# Patient Record
Sex: FEMALE | Race: ASIAN | NOT HISPANIC OR LATINO | Employment: OTHER | URBAN - METROPOLITAN AREA
[De-identification: names, ages, dates, MRNs, and addresses within clinical notes are randomized per-mention and may not be internally consistent; named-entity substitution may affect disease eponyms.]

---

## 2017-11-03 NOTE — PATIENT DISCUSSION
MARY ELLEN, OU- MOST LIKELY DUE TO MGD. TREAT MGD FIRST THEN CONSIDER OTHER TREATMENT OPTIONS IF SYMPTOMS PERSIST.

## 2017-11-03 NOTE — PATIENT DISCUSSION
BLEPHARITIS/MGD, OU- PRESCRIBE LOTEMAX QID, OU. RECOMMEND WARM COMPRESSES AND EYELID SCRUBS BID, ARTIFICIAL TEARS BID-QID, AND THE DAILY INTAKE OF OMEGA-3 FATTY ACIDS (ENCOURAGED JUAN MANUEL'S FISH OIL). CONSIDER LIPIFLOW IF SYMPTOMS PERSIST.

## 2017-11-03 NOTE — PATIENT DISCUSSION
New Prescription: Lotemax (loteprednol etabonate): gel: 0.5% 1 drop four times a day into both eyes 11-

## 2017-11-03 NOTE — PATIENT DISCUSSION
CATARACTS, OU- NOT VISUALLY SIGNIFICANT. DISC OPTION OF [id number]. GLASSES RX GIVEN TO FILL IF DESIRES.

## 2018-06-15 NOTE — PATIENT DISCUSSION
Ocular Rosacea Counseling:  Ocular Rosacea is inflammation that causes redness, itching, and burning of the eyes. I have explained to the patient that if left untreated, ocular rosacea can cause corneal damage from tear film insufficiency or eyelid damage from inflammation, both of which could lead to vision loss. Encouraged patient to avoid exacerbating environmental factors. Successful management is dependent on patient compliance with the treatment regimen.

## 2018-06-15 NOTE — PATIENT DISCUSSION
OCULAR ROSACEA, OU-  PRESCRIBE WARM COMPRESSES AND EYELID SCRUBS BID, ARTIFICIAL TEARS BID-QID AND THE DAILY INTAKE OF OMEGA-3 FATTY ACIDS. CONSIDER TOPICAL STEROID, ORAL TETRACYCLINE AND/OR LIPIFLOW FOR EXACERBATIONS. RETURN FOR FOLLOW-UP AS SCHEDULED.

## 2018-06-15 NOTE — PATIENT DISCUSSION
BLURRED VA, OU- MOST LIKELY DUE TO MGD/OCULAR ROSACEA.  DISC POSS LASER WITH DR CANTU IF NO IMPROVEMENT AFTER TX.

## 2018-06-15 NOTE — PATIENT DISCUSSION
Continue: Refresh Tears (carboxymethylcellulose sodium): drops: 0.5% 1 drop four times a day into both eyes

## 2018-10-22 NOTE — PATIENT DISCUSSION
DRY EYE/OCULAR ROSACEA, OU-  CONTINUE WARM COMPRESSES AND EYELID SCRUBS BID, ARTIFICIAL TEARS BID-QID AND THE DAILY INTAKE OF OMEGA-3 FATTY ACIDS. CONSIDER TOPICAL STEROID, ORAL TETRACYCLINE AND/OR LIPIFLOW FOR EXACERBATIONS.   RETURN FOR FOLLOW-UP IN 6 MONTHS WITH DILATION

## 2018-10-22 NOTE — PATIENT DISCUSSION
CATARACTS, OU - NOT VISUALLY SIGNIFICANT. DISC OPTION OF [id number]. GLASSES RX GIVEN TO FILL IF DESIRES.

## 2018-10-22 NOTE — PATIENT DISCUSSION
Salzmann's Nodular Degeneration Counseling: The diagnosis and its pathophysiology has been explained to the patient. Treatment options include artificial tears, mild topical steroids, or corneal scraping. The evaluation by a corneal specialist was recommended to the patient.

## 2019-07-23 NOTE — PATIENT DISCUSSION
New Prescription: Alrex (loteprednol etabonate): drops,suspension: 0.2% 1 drop twice a day into both eyes 07-

## 2019-07-23 NOTE — PATIENT DISCUSSION
OCULAR ROSACEA, OU- CONSIDER TOPICAL STEROID, ORAL TETRACYCLINE AND/OR LIPIFLOW FOR EXACERBATIONS. RETURN FOR FOLLOW-UP AS SCHEDULED.

## 2019-07-23 NOTE — PATIENT DISCUSSION
BLEPHARITIS/MGD/OCULAR ROSACEA, OU- PERSISTENT. CONTINUE PRESENT TREATMENT OF ARTIFICIAL TEARS AND FISH OIL.

## 2019-07-23 NOTE — PATIENT DISCUSSION
CATARACTS, OU - NOT VISUALLY SIGNIFICANT. DISC OPTION OF [id number]. GLASSES RX GIVEN TO FILL IF DESIRES.

## 2019-07-23 NOTE — PATIENT DISCUSSION
MARY ELLEN, OU- MOST LIKELY DUE TO MGD AND ROSACEA. DISCUSSED TREATMENT OPTIONS OF LIPIFLOW OR LASER TREATMENT TO THE VESSELS IF LIPIFLOW DOES NOT IMPROVE.

## 2019-07-26 NOTE — PATIENT DISCUSSION
Continue: Alrex (loteprednol etabonate): drops,suspension: 0.2% 1 drop twice a day into both eyes 07-

## 2019-10-28 NOTE — PATIENT DISCUSSION
CATARACTS, OU- NOT VISUALLY SIGNIFICANT. DISC OPTION OF [id number]. GLASSES RX GIVEN TO FILL IF DESIRES.

## 2019-10-28 NOTE — PATIENT DISCUSSION
MGD OU - NO OIL PRODUCTION ON COMPRESSION IN OFFICE TODAY. PT DENIES LASER TREATMENT WITH DR. Evie Davis. HE WOULD LIKE TO TRY iLUX TREATMENT TODAY.

## 2019-10-28 NOTE — PATIENT DISCUSSION
christin performed in office today without complication. follow up with Dr. Lakisha Walsh in 1 month.

## 2019-11-27 NOTE — PATIENT DISCUSSION
MARY ELLEN [address] NOTICED NO IMPROVEMENT IN SYMPTOMS S/P I-LUX. LONG DISC ON MARY ELLEN TX: REFRESH/SYSTANE BID-QID QDAILY, GEL/GENTEAL ASHLEY QPM, WARM COMPRESSES &amp; LID HYGIENE. MONITOR PRN.

## 2020-02-24 NOTE — PATIENT DISCUSSION
AMD (DRY), OU:  OS WORSE THAN OD. SIGNIFICANT DRUSEN AND RPE CHANGES IN THE LEFT EYE. REFERRING TO RETINA SPECIALIST TO SEE IF PT NEEDS TREATMENT. CONTINUE TO TAKE AREDS VITAMINS.

## 2020-08-19 ENCOUNTER — IMPORTED ENCOUNTER (OUTPATIENT)
Dept: URBAN - METROPOLITAN AREA CLINIC 9 | Facility: CLINIC | Age: 80
End: 2020-08-19

## 2020-09-15 NOTE — PATIENT DISCUSSION
CATARACTS, OU-  NOT VISUALLY SIGNIFICANT.  VISION HAS NOT CHANGED IN THE PAST YEAR AND PT DESIRES TO WAIT. DISC OPTION OF [id number]. GLASSES RX GIVEN TO FILL IF DESIRES.

## 2020-09-15 NOTE — PATIENT DISCUSSION
LOWER LID RETRACTION AND MGD OU - CONTINUE FISH OIL, WARM COMPRESSES AND TEARS WITH OIL IN THEM, SYSTANE COMPLETE AND OR RETAINE. NO IMPROVEMENT WITH LIPIFLOW IN THE PAST. CONSIDER LOWER LID CONSULT WITH DR. Troy Fox  IF THE PATIENT DESIRES.

## 2020-09-15 NOTE — PATIENT DISCUSSION
VISION DISCOMFORT OU - THE PT COMPLAINS OF STRAIN WHILE READING. THIS IS MOST LIKELY DUE TO DRY EYE SYNDROME VS. CATARACT VS. RPE CHANGES OU.

## 2020-10-02 ENCOUNTER — IMPORTED ENCOUNTER (OUTPATIENT)
Dept: URBAN - METROPOLITAN AREA CLINIC 9 | Facility: CLINIC | Age: 80
End: 2020-10-02

## 2020-11-05 ENCOUNTER — IMPORTED ENCOUNTER (OUTPATIENT)
Dept: URBAN - METROPOLITAN AREA CLINIC 9 | Facility: CLINIC | Age: 80
End: 2020-11-05

## 2021-03-01 NOTE — PATIENT DISCUSSION
MGD OU - RX LOTEMAX BID OU AND XIIDRA BID OU X 1MO. CONTINUE FISH OIL, WARM COMPRESSES AND TEARS WITH OIL IN THEM, SYSTANE COMPLETE Q1HR. NO IMPROVEMENT WITH LIPIFLOW IN THE PAST.  MONITOR IN 1 MONTH

## 2021-03-01 NOTE — PATIENT DISCUSSION
Stopped Today: Alrex (loteprednol etabonate): drops,suspension: 0.2% 1 drop twice a day into both eyes

## 2021-10-14 NOTE — PATIENT DISCUSSION
Patient concerned about the light sensitivity in the right eye after the cataract is removed. Will hold off on surgery in the left eye until the eye is more uncomfortable with more light filtering through the eye. Explained the eye should adapt over time. Patient understands that he will have the same IOL in the left eye when he is ready to proceed with surgery. Will follow up in 1-2 weeks for re-evaluation.

## 2021-10-16 ASSESSMENT — TONOMETRY
OD_IOP_MMHG: 18
OS_IOP_MMHG: 18
OS_IOP_MMHG: 18
OD_IOP_MMHG: 16

## 2021-10-16 ASSESSMENT — VISUAL ACUITY
OD_CC: 20/25 SN
OD_CC: 20/25 SN
OS_CC: 20/25 + SN
OS_CC: 20/25 SN
OS_CC: 20/25 + SN
OD_CC: 20/25 SN

## 2021-10-21 NOTE — PATIENT DISCUSSION
Reassurance given that flickering was normal after surgery and should subside, patient denies any flashes of light.

## 2021-11-22 ENCOUNTER — ESTABLISHED PATIENT (OUTPATIENT)
Dept: URBAN - METROPOLITAN AREA CLINIC 4 | Facility: CLINIC | Age: 81
End: 2021-11-22

## 2021-11-22 DIAGNOSIS — H04.123: ICD-10-CM

## 2021-11-22 DIAGNOSIS — H02.832: ICD-10-CM

## 2021-11-22 DIAGNOSIS — H02.831: ICD-10-CM

## 2021-11-22 DIAGNOSIS — H02.834: ICD-10-CM

## 2021-11-22 DIAGNOSIS — H02.835: ICD-10-CM

## 2021-11-22 PROCEDURE — 92014 COMPRE OPH EXAM EST PT 1/>: CPT

## 2021-11-22 ASSESSMENT — VISUAL ACUITY
OS_SC: 20/50-1
OD_SC: J2
OS_PH: 20/25-1
OD_SC: 20/400
OU_SC: 20/50

## 2021-11-22 ASSESSMENT — TONOMETRY
OS_IOP_MMHG: 18
OD_IOP_MMHG: 17

## 2022-11-22 ENCOUNTER — ESTABLISHED PATIENT (OUTPATIENT)
Dept: URBAN - METROPOLITAN AREA CLINIC 4 | Facility: CLINIC | Age: 82
End: 2022-11-22

## 2022-11-22 DIAGNOSIS — H02.831: ICD-10-CM

## 2022-11-22 DIAGNOSIS — H26.491: ICD-10-CM

## 2022-11-22 DIAGNOSIS — H02.834: ICD-10-CM

## 2022-11-22 DIAGNOSIS — H04.123: ICD-10-CM

## 2022-11-22 DIAGNOSIS — H02.835: ICD-10-CM

## 2022-11-22 DIAGNOSIS — H02.832: ICD-10-CM

## 2022-11-22 PROCEDURE — 92014 COMPRE OPH EXAM EST PT 1/>: CPT

## 2022-11-22 ASSESSMENT — TONOMETRY
OS_IOP_MMHG: 17
OD_IOP_MMHG: 17

## 2022-11-22 ASSESSMENT — VISUAL ACUITY
OD_SC: 20/400
OS_SC: 20/40-1
OD_SC: J1+
OU_SC: 20/50+2

## 2022-12-01 NOTE — PATIENT DISCUSSION
Ed. patient. Previous lid surgery by Dr. Cortez Castano. appears to be affecting vision again. Patient to call if wishes to schedule with Dr. Cortez aCstano.

## 2023-05-04 ENCOUNTER — ESTABLISHED PATIENT (OUTPATIENT)
Dept: URBAN - METROPOLITAN AREA CLINIC 4 | Facility: CLINIC | Age: 83
End: 2023-05-04

## 2023-05-04 DIAGNOSIS — H02.834: ICD-10-CM

## 2023-05-04 DIAGNOSIS — H02.832: ICD-10-CM

## 2023-05-04 DIAGNOSIS — H02.835: ICD-10-CM

## 2023-05-04 DIAGNOSIS — H04.123: ICD-10-CM

## 2023-05-04 DIAGNOSIS — H26.491: ICD-10-CM

## 2023-05-04 DIAGNOSIS — H02.831: ICD-10-CM

## 2023-05-04 PROCEDURE — 92015 DETERMINE REFRACTIVE STATE: CPT

## 2023-05-04 PROCEDURE — 92014 COMPRE OPH EXAM EST PT 1/>: CPT

## 2023-05-04 ASSESSMENT — VISUAL ACUITY
OD_SC: 20/200
OS_SC: 20/25
OU_SC: 20/25
OD_SC: J1

## 2023-05-04 ASSESSMENT — TONOMETRY
OS_IOP_MMHG: 16
OD_IOP_MMHG: 15

## 2023-07-26 NOTE — PATIENT DISCUSSION
CATARACTS, OU- NOT VISUALLY SIGNIFICANT. VISION HAS NOT CHANGED IN THE PAST YEAR AND PT DESIRES TO WAIT. DISC OPTION OF [id number]. GLASSES RX GIVEN TO FILL IF DESIRES. Occupational Therapy    Visit Type: re-evaluation  SUBJECTIVE  Patient agreed to participate in therapy this date.    Patient agreeable to sitting on the edge of the bed. Able to mouth words to make needs known.     RNYahaira okays session prior and assists during.   Patient / Family Goal: return home    Pain   Patient reports pain is not an issue/concern.    OBJECTIVE     Cognitive Status   Level of Consciousness   - alert  Arousal Alertness   - appropriate responses to stimuli  Affect/Behavior    - calm and cooperative  Orientation    - Unable to assess  Functional Communication   - Overall Status: impaired   - Forms of Communication: nods/gestures appropriately  Attention Span    - Attention: impaired   - Attention impairment: fatigue  Following Direction   - follows one step commands with increased time    Vitals:  MAPs low stable with systolic pressures in the 90s, spO2 >90% while dangling.     Patient Activity Tolerance: 1 to 1 activity to rest (fair-, limited by weakness, deconditioning, fatigue)      Range of Motion (ROM)   (degrees unless noted; active unless noted; norms in ( ); negative=lacking to 0, positive=beyond 0)  WFL: LUE, RUE    Strength  (out of 5 unless noted, standard test position unless noted)   Comments / Details: Mild strength deficits noted during bed mobility       Sitting Balance  (ADRIENNE = base of support)  Static      - Trial 1 details: minimal assist and with double LE support       Bed Mobility  - Supine to sit: total assist - non-dependent, 2 persons  - Sit to supine: total assist - non-dependent  Max A x 3 for supine to sit with HOB elevated for assist with supporting LLE in transition, advancing trunk and scooting hips due to weakness. Once seated, LLE supported on step stool and patient overall requires min A for balance while sitting approximately 4 minutes. Returned to supine with max A x 3 (3rd person primarily for lines).   Transfers  - Sit to stand: not attempted due to not  medically appropriate or safe      Activities of Daily Living (ADLs)  Lower Body Dressing:   - Footwear:       - Assistance: total assist - dependent   - Assist needed for: don/doff right sock and don/doff left sock  Total assist to don socks and leg brace.   Interventions   Shoulder abduction: bilateral, supine, AAROM, 10 reps, 1 sets   Shoulder adduction: bilateral, supine, AAROM, 10 reps, 1 sets   Shoulder flexion: bilateral, supine, AAROM, 10 reps, 1 sets   Elbow extension: bilateral, supine, AAROM, 10 reps, 1 sets   Elbow flexion: bilateral, supine, AAROM, 10 reps, 1 sets  Neuromuscular Re-Education: Facilitated dynamic sitting balance with lateral weight-shifting onto elbow bilaterally x5 reps to increase postural control for seated ADLs.   Additional Intervention Details: Increased time for multiple attempts         Education:   - Present and ready to learn: patient  Education provided during session:  - Results of above outlined education: Needs reinforcement    ASSESSMENT   Progress: progressing toward goals  Interferring components: decreased activity tolerance    Summary of function and discharge needs based on today's assessment:  - Current level of function: significantly below baseline level of function  - Therapy needs at discharge: intensive daily therapy  - Activities of daily living (ADLs) requiring support at discharge: bed mobility, transfers, ambulation, grooming, dressing, bathing and toileting  - Instrumental activities of daily living (IADLs) requiring support at discharge: home management, meal preparation, shopping, community mobility, driving and health/medication management  - Impairments that require further therapy intervention: strength, activity tolerance, balance and cognition  AM-PAC  - Prior Level of Function: IND/MOD I ([facility] 22-24)       Key: MOD A=moderate assistance, IND/MOD I=independent/modified independent  - Generalized Current Level of Function     - Current Self-Cares:  6       Scoring Key= >21 Modified Independent; 20-21 Supervision; 18-19 Minimal assist; 13-18 Moderate assist; 9-12 Max assist; <9 Total assist        PLAN  Suggestions for next session as indicated: Progress sitting balance/tolerance, seated ADLs, transfer trials with STEDY?    OT Frequency: 3-5 x per week      PT/OT Mobility Equipment for Discharge: owns crutches and cane, will determine if ww needed prior to DC  PT/OT ADL Equipment for Discharge: continue to assess  Interventions: ADL retraining, functional transfer training, activity tolerance training, upper extremity strengthening/ROM, patient/family training, balance, bed mobility training, compensatory technique education, compensatory techniques, therapeutic exercise, therapeutic activity and patient education  Agreement to plan and goals: patient agrees with goals and treatment plan      GOALS  Review Date: 8/2/2023  Long Term Goals: (to be met by time of discharge from hospital)  Grooming: Patient will complete grooming tasks in sitting modified independent.  Status: revised, this goal modified  Upper body dressing: Patient will complete upper body dressing in sitting minimal assist.  Status: revised, this goal modified  Lower body dressing: Patient will complete lower body dressing in sitting moderate assist.  Status: revised, this goal modified  Toileting: Patient will complete toileting moderate assist.  Status: revised, this goal modified  Bathing: Patient will complete bathing chairminimal assist and moderate assist   Status: revised, this goal modified (min assist with UB dressing, mod assist with LB dressing )Toilet transfer: Patient will complete toilet transfer with gait belt and least restrictive device, minimal assist.  Status: revised, this goal modified  Home setting transfer: Patient will complete home setting transfers with gait belt and least restrictive device, contact guard or touching/steadying.   Status: revised, this goal  modified  Item retrieval: Patient will complete item retrieval modified independent.         Documented in the chart in the following areas: Assessment/Plan.    Patient at End of Session:   Location: in bed  Safety measures: bed rails x4 and call light within reach  Handoff to: nurse      Therapy procedure time and total treatment time can be found documented on the Time Entry flowsheet

## 2023-09-02 NOTE — PATIENT DISCUSSION
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

## 2024-07-03 NOTE — PATIENT DISCUSSION
Continue: Systane Ultra (peg 400-propylene glycol): drops: 0.4-0.3% Quality 431: Preventive Care And Screening: Unhealthy Alcohol Use - Screening: Patient not identified as an unhealthy alcohol user when screened for unhealthy alcohol use using a systematic screening method Detail Level: Detailed Quality 226: Preventive Care And Screening: Tobacco Use: Screening And Cessation Intervention: Patient screened for tobacco use and is an ex/non-smoker Quality 111:Pneumonia Vaccination Status For Older Adults: Pneumococcal Vaccination Previously Received Quality 130: Documentation Of Current Medications In The Medical Record: Current Medications Documented Quality 47: Advance Care Plan: Advance care planning not documented, reason not otherwise specified.

## 2024-10-25 NOTE — PATIENT DISCUSSION
Apixaban/Eliquis - Compliance/Apixaban/Eliquis - Dietary Advice/Apixaban/Eliquis - Follow up monitoring/Apixaban/Eliquis - Potential for adverse drug reactions and interactions Glasses Prescribed: